# Patient Record
Sex: MALE | URBAN - METROPOLITAN AREA
[De-identification: names, ages, dates, MRNs, and addresses within clinical notes are randomized per-mention and may not be internally consistent; named-entity substitution may affect disease eponyms.]

---

## 2019-04-22 ENCOUNTER — OFFICE VISIT (OUTPATIENT)
Dept: URBAN - METROPOLITAN AREA CLINIC 11 | Facility: CLINIC | Age: 34
End: 2019-04-22
Payer: COMMERCIAL

## 2019-04-22 DIAGNOSIS — E11.3593 TYPE 2 DIABETES MELLITUS W/ PROLIFERATIVE DIABETIC RETINOPATHY W/O MACULAR EDEMA, BILATERAL: Primary | ICD-10-CM

## 2019-04-22 DIAGNOSIS — H25.041 POSTERIOR SUBCAPSULAR POLAR AGE-RELATED CATARACT, RIGHT EYE: ICD-10-CM

## 2019-04-22 PROCEDURE — 92134 CPTRZ OPH DX IMG PST SGM RTA: CPT | Performed by: OPTOMETRIST

## 2019-04-22 PROCEDURE — 99204 OFFICE O/P NEW MOD 45 MIN: CPT | Performed by: OPTOMETRIST

## 2019-04-22 ASSESSMENT — INTRAOCULAR PRESSURE
OD: 18
OS: 18

## 2019-04-22 ASSESSMENT — KERATOMETRY
OS: 44.13
OD: 43.63

## 2019-04-22 NOTE — IMPRESSION/PLAN
Impression: Type 2 diabetes mellitus w/ proliferative diabetic retinopathy w/o macular edema, bilateral: E11.3593. s/p PRP OD Plan: OCT of macula ordered and performed today ou. No DME OU. Signs of neovascularization noted. Will refer patient for a retinal consult and tx with retinal specialist.  Discussed ocular and systemic benefits of maintaining blood sugar control.  RTC 1-2wks DE

## 2019-05-09 ENCOUNTER — OFFICE VISIT (OUTPATIENT)
Dept: URBAN - METROPOLITAN AREA CLINIC 11 | Facility: CLINIC | Age: 34
End: 2019-05-09
Payer: COMMERCIAL

## 2019-05-09 DIAGNOSIS — H43.12 VITREOUS HEMORRHAGE, LEFT EYE: ICD-10-CM

## 2019-05-09 DIAGNOSIS — E10.3512 TYPE 1 DIABETES MELLITUS W/ PROLIFERATIVE DIABETIC RETINOPATHY W/ MACULAR EDEMA, LEFT EYE: Primary | ICD-10-CM

## 2019-05-09 DIAGNOSIS — E10.3591 TYPE 1 DIABETES MELLITUS W/ PROLIFERATIVE DIABETIC RETINOPATHY W/O MACULAR EDEMA, RIGHT EYE: ICD-10-CM

## 2019-05-09 PROCEDURE — 99204 OFFICE O/P NEW MOD 45 MIN: CPT | Performed by: OPHTHALMOLOGY

## 2019-05-09 PROCEDURE — 92134 CPTRZ OPH DX IMG PST SGM RTA: CPT | Performed by: OPHTHALMOLOGY

## 2019-05-09 RX ORDER — OFLOXACIN 3 MG/ML
0.3 % SOLUTION/ DROPS OPHTHALMIC
Qty: 1 | Refills: 0 | Status: INACTIVE
Start: 2019-05-09 | End: 2019-07-11

## 2019-05-09 RX ORDER — PREDNISOLONE ACETATE 10 MG/ML
1 % SUSPENSION/ DROPS OPHTHALMIC
Qty: 1 | Refills: 2 | Status: INACTIVE
Start: 2019-05-09 | End: 2019-07-11

## 2019-05-09 ASSESSMENT — INTRAOCULAR PRESSURE
OD: 14
OS: 14

## 2019-05-09 NOTE — IMPRESSION/PLAN
Impression: Age-related nuclear cataract, right eye: H25.11. OD. Plan: Discussed diagnosis in detail with patient. Advised patient of condition. Patient is cleared for cataract eval as needed after surgery in the left eye.

## 2019-05-09 NOTE — IMPRESSION/PLAN
Impression: Type 1 diabetes mellitus w/ proliferative diabetic retinopathy w/o macular edema, right eye: e10.3591. OD.
s/p RD repair 2015 w/ PPVx Plan: OCT ordered and performed today. The clinical exam is consistent with proliferative diabetic retinopathy. Discussed diagnosis with patient. Recommend close observation at this time. Discussed risk of progression with the present condition. The patient was advised to maintain tight blood sugar control, blood pressure and lipid control. Patient agrees with plan.

## 2019-05-09 NOTE — IMPRESSION/PLAN
Impression: Vitreous hemorrhage, left eye: H43.12 OS. Plan: OCT ordered and performed today. Discussed diagnosis with patient. The clinical exam is consistent with Vitreous Hemorrhage. Disccused possible PPVx if hemorrhage has worsen. Patient agrees and understands.

## 2019-05-09 NOTE — IMPRESSION/PLAN
Impression: Type 1 diabetes mellitus w/ proliferative diabetic retinopathy w/ macular edema, left eye: I56.4897. OS.
s/p focal laser Plan: Discussed diagnosis with patient, recommend Panretinal photocoagulation. The specific risk of PRP were discussed with the patient and include (but are not limited to) loss of peripheral and central vision, loss of ability to focus at near, pain, progressive macular edema, choroidal detachment and need for further treatment after discussing R/B/A of treatment including observation. The patient elects to proceed with PRP .  RL-2

## 2019-05-16 ENCOUNTER — SURGERY (OUTPATIENT)
Dept: URBAN - METROPOLITAN AREA SURGERY 15 | Facility: SURGERY | Age: 34
End: 2019-05-16
Payer: COMMERCIAL

## 2019-05-16 PROCEDURE — 67228 TREATMENT X10SV RETINOPATHY: CPT | Performed by: OPHTHALMOLOGY

## 2019-07-11 ENCOUNTER — OFFICE VISIT (OUTPATIENT)
Dept: URBAN - METROPOLITAN AREA CLINIC 11 | Facility: CLINIC | Age: 34
End: 2019-07-11
Payer: COMMERCIAL

## 2019-07-11 DIAGNOSIS — H25.11 AGE-RELATED NUCLEAR CATARACT, RIGHT EYE: ICD-10-CM

## 2019-07-11 PROCEDURE — 99213 OFFICE O/P EST LOW 20 MIN: CPT | Performed by: OPHTHALMOLOGY

## 2019-07-11 ASSESSMENT — INTRAOCULAR PRESSURE
OD: 14
OS: 44

## 2019-07-11 NOTE — IMPRESSION/PLAN
Impression: Age-related nuclear cataract, right eye: H25.11. OD. Plan: Discussed diagnosis in detail with patient. Discussed risks and benefits and patient understands. Consult recommended with Cataract physician. Patient will consider surgery.

## 2019-07-11 NOTE — IMPRESSION/PLAN
Impression: Type 2 diabetes mellitus w/ proliferative diabetic retinopathy w/o macular edema, bilateral: H64.8430. OU.
s/p kenalog OS  Plan: OCT ordered and performed today. The clinical exam is consistent with proliferative diabetic retinopathy. Discussed diagnosis with patient. Recommend close observation at this time. Discussed risk of progression with the present condition. 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 Patient was given Combigan sample in office today and is to start BID in the left eye. He will then re evaluate with Dr. Cee Dias within the next 2 weeks. The patient was advised to maintain tight blood sugar control, blood pressure and lipid control. Patient agrees with plan.

## 2019-07-16 ENCOUNTER — OFFICE VISIT (OUTPATIENT)
Dept: URBAN - METROPOLITAN AREA CLINIC 33 | Facility: CLINIC | Age: 34
End: 2019-07-16
Payer: COMMERCIAL

## 2019-07-16 PROCEDURE — 99214 OFFICE O/P EST MOD 30 MIN: CPT | Performed by: OPHTHALMOLOGY

## 2019-07-16 RX ORDER — DUREZOL 0.5 MG/ML
0.05 % EMULSION OPHTHALMIC
Qty: 10 | Refills: 2 | Status: INACTIVE
Start: 2019-07-16 | End: 2019-08-08

## 2019-07-16 RX ORDER — MOXIFLOXACIN HYDROCHLORIDE 5 MG/ML
0.5 % SOLUTION/ DROPS OPHTHALMIC
Qty: 10 | Refills: 1 | Status: INACTIVE
Start: 2019-07-16 | End: 2019-08-08

## 2019-07-16 ASSESSMENT — INTRAOCULAR PRESSURE
OD: 6
OS: 12

## 2019-07-16 ASSESSMENT — KERATOMETRY
OD: 43.38
OS: 43.88

## 2019-07-16 ASSESSMENT — VISUAL ACUITY
OS: 20/20
OD: 20/40

## 2019-07-16 NOTE — IMPRESSION/PLAN
Impression: Type 2 diabetes mellitus w/ proliferative diabetic retinopathy w/o macular edema, bilateral: H59.4022. OU.
s/p kenalog OS Plan: monitor.  Keep follow up with Dr Jane Nguyen

## 2019-07-16 NOTE — IMPRESSION/PLAN
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 Plan: Intraocular pressure elevated post kenalog injection from Dr Rene Esparza as directed. Will recommend full glaucoma work up prior to cat Sx.  
IOP/ Gonio and HVF 24-2

## 2019-07-16 NOTE — IMPRESSION/PLAN
Impression: Age-related nuclear cataract, bilateral: H25.13. Plan: Cataracts account for some decreased vision, however, the patient is aware with macular changes that level of vision post operatively cannot be determined. Cataracts are limiting vision. Discussed risks, benefits and alternatives to surgery including but not limited to: bleeding, infection, risk of vision loss, loss of the eye, need for other surgery. Patient voiced understanding and wishes to proceed. Patient desires surgery, recommend phacoemulsification with intraocular lens. If Durezol/Vigamox not covered by insurance, okay to switch to generic. RIGHT EYE  only will Monitor [facility] changes in OS. RL2
STANDARD LENS ( NOT a candidate for upgrades) AIM: Distance * steroid responder* post Kenalog injection w/ Dr Jeffery Street. Plan for full Glc work up prior to Cat Sx. USE ILEVRO Pt understands will need glasses for BCVA  including for Near [address] after Sx.

## 2019-07-24 ENCOUNTER — PRE-OPERATIVE VISIT (OUTPATIENT)
Dept: URBAN - METROPOLITAN AREA CLINIC 11 | Facility: CLINIC | Age: 34
End: 2019-07-24
Payer: COMMERCIAL

## 2019-07-24 PROCEDURE — 92136 OPHTHALMIC BIOMETRY: CPT | Performed by: OPHTHALMOLOGY

## 2019-07-24 ASSESSMENT — PACHYMETRY
OS: 23.97
OD: 24.12
OS: 3.81
OD: 3.73

## 2019-08-05 ENCOUNTER — TESTING ONLY (OUTPATIENT)
Dept: URBAN - METROPOLITAN AREA CLINIC 33 | Facility: CLINIC | Age: 34
End: 2019-08-05
Payer: COMMERCIAL

## 2019-08-05 PROCEDURE — 92083 EXTENDED VISUAL FIELD XM: CPT | Performed by: OPHTHALMOLOGY

## 2019-08-06 ENCOUNTER — OFFICE VISIT (OUTPATIENT)
Dept: URBAN - METROPOLITAN AREA CLINIC 33 | Facility: CLINIC | Age: 34
End: 2019-08-06
Payer: COMMERCIAL

## 2019-08-06 DIAGNOSIS — H40.042 STEROID RESPONDER, LEFT EYE: Primary | ICD-10-CM

## 2019-08-06 DIAGNOSIS — H25.13 AGE-RELATED NUCLEAR CATARACT, BILATERAL: ICD-10-CM

## 2019-08-06 PROCEDURE — 92020 GONIOSCOPY: CPT | Performed by: OPHTHALMOLOGY

## 2019-08-06 PROCEDURE — 99214 OFFICE O/P EST MOD 30 MIN: CPT | Performed by: OPHTHALMOLOGY

## 2019-08-06 ASSESSMENT — INTRAOCULAR PRESSURE
OD: 17
OS: 18

## 2019-08-06 NOTE — IMPRESSION/PLAN
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 Plan: Intraocular pressure elevated post kenalog injection from Dr Alexei Hartman- Pt has been OFF Combigan for x5 days. Monitor IOP closley Post cataract Sx. Observe off Combigan for now. If IOP increases Post Sx will re-start combigan. Pt had HVF test today reviewed with patient.

## 2019-08-06 NOTE — IMPRESSION/PLAN
Impression: Age-related nuclear cataract, bilateral: H25.13. Plan: OK to proceed with Sx as scheduled** Cataracts account for some decreased vision, however, the patient is aware with macular changes that level of vision post operatively cannot be determined. Cataracts are limiting vision. Discussed risks, benefits and alternatives to surgery including but not limited to: bleeding, infection, risk of vision loss, loss of the eye, need for other surgery. Patient voiced understanding and wishes to proceed. Patient desires surgery, recommend phacoemulsification with intraocular lens. If Durezol/Vigamox not covered by insurance, okay to switch to generic. RIGHT EYE  only will Monitor [facility] changes in OS. RL2
STANDARD LENS ( NOT a candidate for upgrades) AIM: Distance * steroid responder* post Kenalog injection w/ Dr Virginia Lazo. ----> Monitor IOP closley post operatively. USE ILEVRO Pt understands will need glasses for BCVA  including for Near [address] after Sx.

## 2019-08-06 NOTE — IMPRESSION/PLAN
Impression: Type 2 diabetes mellitus w/ proliferative diabetic retinopathy w/o macular edema, bilateral: N49.2177. OU.
s/p kenalog OS Plan: monitor.  Keep follow up with Dr Jabier Phipps

## 2019-08-13 ENCOUNTER — SURGERY (OUTPATIENT)
Dept: URBAN - METROPOLITAN AREA SURGERY 15 | Facility: SURGERY | Age: 34
End: 2019-08-13
Payer: COMMERCIAL

## 2019-08-13 PROCEDURE — 66984 XCAPSL CTRC RMVL W/O ECP: CPT | Performed by: OPHTHALMOLOGY

## 2019-08-14 ENCOUNTER — POST-OPERATIVE VISIT (OUTPATIENT)
Dept: URBAN - METROPOLITAN AREA CLINIC 11 | Facility: CLINIC | Age: 34
End: 2019-08-14

## 2019-08-14 DIAGNOSIS — Z09 ENCNTR FOR F/U EXAM AFT TRTMT FOR COND OTH THAN MALIG NEOPLM: Primary | ICD-10-CM

## 2019-08-14 PROCEDURE — 99024 POSTOP FOLLOW-UP VISIT: CPT | Performed by: OPTOMETRIST

## 2019-08-14 ASSESSMENT — INTRAOCULAR PRESSURE
OD: 12
OS: 17

## 2019-08-21 ENCOUNTER — POST-OPERATIVE VISIT (OUTPATIENT)
Dept: URBAN - METROPOLITAN AREA CLINIC 11 | Facility: CLINIC | Age: 34
End: 2019-08-21

## 2019-08-21 PROCEDURE — 99024 POSTOP FOLLOW-UP VISIT: CPT | Performed by: OPTOMETRIST

## 2019-08-21 ASSESSMENT — INTRAOCULAR PRESSURE
OD: 12
OS: 12

## 2019-09-23 ENCOUNTER — OFFICE VISIT (OUTPATIENT)
Dept: URBAN - METROPOLITAN AREA CLINIC 11 | Facility: CLINIC | Age: 34
End: 2019-09-23
Payer: COMMERCIAL

## 2019-09-23 PROCEDURE — 92014 COMPRE OPH EXAM EST PT 1/>: CPT | Performed by: OPTOMETRIST

## 2019-09-23 ASSESSMENT — INTRAOCULAR PRESSURE
OS: 18
OD: 28

## 2019-09-23 NOTE — IMPRESSION/PLAN
Impression: Type 2 diabetes mellitus w/ proliferative diabetic retinopathy w/o macular edema, bilateral: R04.9769. OU.
s/p kenalog OS Plan: Signs of neovascularization noted OD. Will refer patient for a retinal consult and tx with retinal specialist.  Discussed ocular and systemic benefits of maintaining blood sugar control.  RTC 1-2wks DE

## 2019-09-26 ENCOUNTER — OFFICE VISIT (OUTPATIENT)
Dept: URBAN - METROPOLITAN AREA CLINIC 11 | Facility: CLINIC | Age: 34
End: 2019-09-26
Payer: COMMERCIAL

## 2019-09-26 DIAGNOSIS — E11.3592 TYPE 2 DIABETES MELLITUS W/ PROLIFERATIVE DIABETIC RETINOPATHY W/O MACULAR EDEMA, LEFT EYE: ICD-10-CM

## 2019-09-26 DIAGNOSIS — H43.11 VITREOUS HEMORRHAGE, RIGHT EYE: Primary | ICD-10-CM

## 2019-09-26 PROCEDURE — 99213 OFFICE O/P EST LOW 20 MIN: CPT | Performed by: OPHTHALMOLOGY

## 2019-09-26 PROCEDURE — 76512 OPH US DX B-SCAN: CPT | Performed by: OPHTHALMOLOGY

## 2019-09-26 RX ORDER — OFLOXACIN 3 MG/ML
0.3 % SOLUTION/ DROPS OPHTHALMIC
Qty: 1 | Refills: 0 | Status: ACTIVE
Start: 2019-09-26

## 2019-09-26 RX ORDER — PREDNISOLONE ACETATE 10 MG/ML
1 % SUSPENSION/ DROPS OPHTHALMIC
Qty: 1 | Refills: 2 | Status: ACTIVE
Start: 2019-09-26

## 2019-09-26 ASSESSMENT — INTRAOCULAR PRESSURE
OD: 12
OS: 14

## 2019-09-26 NOTE — IMPRESSION/PLAN
Impression: Type 2 diabetes mellitus w/ proliferative diabetic retinopathy w/o macular edema, left eye: F84.6193. OS.
s/p PRP OS Plan: OCT ordered and performed today. The clinical exam is consistent with proliferative diabetic retinopathy. Discussed diagnosis with patient. Recommend close observation at this time. Discussed risk of progression with the present condition. The patient was advised to maintain tight blood sugar control, blood pressure and lipid control. Patient agrees with plan.

## 2019-09-26 NOTE — IMPRESSION/PLAN
Impression: Vitreous hemorrhage, right eye: H43.11. OD. Plan: OCT ordered and performed today. Discussed diagnosis with patient. The clinical exam is consistent with Non clearing Vitreous Hemorrhage. Discussed treatment options with patient. Observation vs Vitrectomy. Recommend sx to clear Vitreous Hemorrhage. After a through discussion of surgical R/B/A, the patient elects to proceed with sx. The patient understands the potential risks of sx, including (but not limited to) bleeding, pain, infection, loss of vision, loss of eye and possible need for more sx. RL-2. Discussed with patient if before surgery his vision improved, to return for evaluation.

## 2023-06-07 NOTE — IMPRESSION/PLAN
Impression: Posterior subcapsular polar age-related cataract, right eye: H25.041. Plan: Recommend CE IOL OD.  Refer to MD for Cat sx eval. not a candidate for an upgraded lens 23